# Patient Record
Sex: MALE | Employment: FULL TIME | URBAN - METROPOLITAN AREA
[De-identification: names, ages, dates, MRNs, and addresses within clinical notes are randomized per-mention and may not be internally consistent; named-entity substitution may affect disease eponyms.]

---

## 2020-06-10 ENCOUNTER — APPOINTMENT (OUTPATIENT)
Dept: CT IMAGING | Age: 60
DRG: 377 | End: 2020-06-10
Attending: PHYSICIAN ASSISTANT
Payer: OTHER GOVERNMENT

## 2020-06-10 ENCOUNTER — HOSPITAL ENCOUNTER (INPATIENT)
Age: 60
LOS: 1 days | Discharge: HOME | DRG: 377 | End: 2020-06-12
Attending: EMERGENCY MEDICINE | Admitting: HOSPITALIST
Payer: OTHER GOVERNMENT

## 2020-06-10 DIAGNOSIS — K92.1 MELENA: Primary | ICD-10-CM

## 2020-06-10 DIAGNOSIS — R55 PRE-SYNCOPE: ICD-10-CM

## 2020-06-10 LAB
ABO + RH BLD: NORMAL
ALBUMIN SERPL-MCNC: 3.4 G/DL (ref 3.4–5)
ALBUMIN/GLOB SERPL: 1.2 {RATIO} (ref 0.8–1.7)
ALP SERPL-CCNC: 83 U/L (ref 45–117)
ALT SERPL-CCNC: 27 U/L (ref 16–61)
ANION GAP SERPL CALC-SCNC: 6 MMOL/L (ref 3–18)
APPEARANCE UR: CLEAR
AST SERPL-CCNC: 18 U/L (ref 10–38)
BASOPHILS # BLD: 0 K/UL (ref 0–0.1)
BASOPHILS NFR BLD: 0 % (ref 0–2)
BILIRUB SERPL-MCNC: 0.4 MG/DL (ref 0.2–1)
BILIRUB UR QL: NEGATIVE
BLOOD GROUP ANTIBODIES SERPL: NORMAL
BUN SERPL-MCNC: 31 MG/DL (ref 7–18)
BUN/CREAT SERPL: 31 (ref 12–20)
CALCIUM SERPL-MCNC: 8.4 MG/DL (ref 8.5–10.1)
CHLORIDE SERPL-SCNC: 109 MMOL/L (ref 100–111)
CK MB CFR SERPL CALC: 2.1 % (ref 0–4)
CK MB SERPL-MCNC: 2.7 NG/ML (ref 5–25)
CK SERPL-CCNC: 127 U/L (ref 39–308)
CO2 SERPL-SCNC: 25 MMOL/L (ref 21–32)
COLOR UR: YELLOW
CREAT SERPL-MCNC: 1.01 MG/DL (ref 0.6–1.3)
DIFFERENTIAL METHOD BLD: ABNORMAL
EOSINOPHIL # BLD: 0.1 K/UL (ref 0–0.4)
EOSINOPHIL NFR BLD: 1 % (ref 0–5)
ERYTHROCYTE [DISTWIDTH] IN BLOOD BY AUTOMATED COUNT: 12 % (ref 11.6–14.5)
GLOBULIN SER CALC-MCNC: 2.8 G/DL (ref 2–4)
GLUCOSE SERPL-MCNC: 147 MG/DL (ref 74–99)
GLUCOSE UR STRIP.AUTO-MCNC: NEGATIVE MG/DL
HCT VFR BLD AUTO: 35.9 % (ref 36–48)
HEMOCCULT STL QL: POSITIVE
HGB BLD-MCNC: 12.1 G/DL (ref 13–16)
HGB UR QL STRIP: NEGATIVE
INR PPP: 1.1 (ref 0.8–1.2)
KETONES UR QL STRIP.AUTO: NEGATIVE MG/DL
LEUKOCYTE ESTERASE UR QL STRIP.AUTO: ABNORMAL
LYMPHOCYTES # BLD: 2.1 K/UL (ref 0.9–3.6)
LYMPHOCYTES NFR BLD: 21 % (ref 21–52)
MCH RBC QN AUTO: 29.6 PG (ref 24–34)
MCHC RBC AUTO-ENTMCNC: 33.7 G/DL (ref 31–37)
MCV RBC AUTO: 87.8 FL (ref 74–97)
MONOCYTES # BLD: 0.8 K/UL (ref 0.05–1.2)
MONOCYTES NFR BLD: 8 % (ref 3–10)
NEUTS SEG # BLD: 7.1 K/UL (ref 1.8–8)
NEUTS SEG NFR BLD: 70 % (ref 40–73)
NITRITE UR QL STRIP.AUTO: NEGATIVE
PH UR STRIP: 5 [PH] (ref 5–8)
PLATELET # BLD AUTO: 247 K/UL (ref 135–420)
PMV BLD AUTO: 9.8 FL (ref 9.2–11.8)
POTASSIUM SERPL-SCNC: 3.6 MMOL/L (ref 3.5–5.5)
PROT SERPL-MCNC: 6.2 G/DL (ref 6.4–8.2)
PROT UR STRIP-MCNC: NEGATIVE MG/DL
PROTHROMBIN TIME: 14 SEC (ref 11.5–15.2)
RBC # BLD AUTO: 4.09 M/UL (ref 4.7–5.5)
RBC #/AREA URNS HPF: NORMAL /HPF (ref 0–5)
SODIUM SERPL-SCNC: 140 MMOL/L (ref 136–145)
SP GR UR REFRACTOMETRY: >1.03 (ref 1–1.03)
SPECIMEN EXP DATE BLD: NORMAL
TROPONIN I SERPL-MCNC: 0.04 NG/ML (ref 0–0.04)
UROBILINOGEN UR QL STRIP.AUTO: 1 EU/DL (ref 0.2–1)
WBC # BLD AUTO: 10.1 K/UL (ref 4.6–13.2)
WBC URNS QL MICRO: NORMAL /HPF (ref 0–4)

## 2020-06-10 PROCEDURE — 74011000250 HC RX REV CODE- 250: Performed by: PHYSICIAN ASSISTANT

## 2020-06-10 PROCEDURE — 80053 COMPREHEN METABOLIC PANEL: CPT

## 2020-06-10 PROCEDURE — 85025 COMPLETE CBC W/AUTO DIFF WBC: CPT

## 2020-06-10 PROCEDURE — 82270 OCCULT BLOOD FECES: CPT

## 2020-06-10 PROCEDURE — 74177 CT ABD & PELVIS W/CONTRAST: CPT

## 2020-06-10 PROCEDURE — 82550 ASSAY OF CK (CPK): CPT

## 2020-06-10 PROCEDURE — C9113 INJ PANTOPRAZOLE SODIUM, VIA: HCPCS | Performed by: PHYSICIAN ASSISTANT

## 2020-06-10 PROCEDURE — 99285 EMERGENCY DEPT VISIT HI MDM: CPT

## 2020-06-10 PROCEDURE — 85610 PROTHROMBIN TIME: CPT

## 2020-06-10 PROCEDURE — 96374 THER/PROPH/DIAG INJ IV PUSH: CPT

## 2020-06-10 PROCEDURE — 86900 BLOOD TYPING SEROLOGIC ABO: CPT

## 2020-06-10 PROCEDURE — 74011636320 HC RX REV CODE- 636/320: Performed by: EMERGENCY MEDICINE

## 2020-06-10 PROCEDURE — 74011250636 HC RX REV CODE- 250/636: Performed by: PHYSICIAN ASSISTANT

## 2020-06-10 PROCEDURE — 81001 URINALYSIS AUTO W/SCOPE: CPT

## 2020-06-10 PROCEDURE — 93005 ELECTROCARDIOGRAM TRACING: CPT

## 2020-06-10 RX ORDER — ATORVASTATIN CALCIUM 10 MG/1
5 TABLET, FILM COATED ORAL DAILY
COMMUNITY

## 2020-06-10 RX ORDER — METFORMIN HYDROCHLORIDE 500 MG/1
500 TABLET ORAL 2 TIMES DAILY WITH MEALS
COMMUNITY

## 2020-06-10 RX ORDER — PANTOPRAZOLE SODIUM 40 MG/10ML
80 INJECTION, POWDER, LYOPHILIZED, FOR SOLUTION INTRAVENOUS
Status: DISCONTINUED | OUTPATIENT
Start: 2020-06-10 | End: 2020-06-10 | Stop reason: CLARIF

## 2020-06-10 RX ADMIN — IOPAMIDOL 100 ML: 612 INJECTION, SOLUTION INTRAVENOUS at 19:37

## 2020-06-10 RX ADMIN — SODIUM CHLORIDE 80 MG: 9 INJECTION, SOLUTION INTRAMUSCULAR; INTRAVENOUS; SUBCUTANEOUS at 18:46

## 2020-06-10 RX ADMIN — SODIUM CHLORIDE 1000 ML: 900 INJECTION, SOLUTION INTRAVENOUS at 17:35

## 2020-06-10 NOTE — ED TRIAGE NOTES
Patient states working out in heat yesterday at the [employer] yesterday. He states after taking shower at approximately 1930 last night, he collapsed to the floor but did not lose consciousness. C/o having severe headache last night that ended at noon today. He c/o residual fatigue and dizziness with standing for long periods of time. Denies difficulty with moving extremities. Denies gait instability. C/o dark stools.  States using Pepto Bismol

## 2020-06-10 NOTE — ED PROVIDER NOTES
EMERGENCY DEPARTMENT HISTORY AND PHYSICAL EXAM    5:18 PM      Date: 6/10/2020  Patient Name: Nayeli Pyle    History of Presenting Illness     Chief Complaint   Patient presents with   [place] Fall    Dizziness    Fatigue         History Provided By: Patient    Additional History (Context): Nayeli Pyle is a 60 y.o. male with DM, HLD and past surgical hx of sigmoidectomy, colostomy with reversal who presents with complaint of fatigue, dizziness, nausea, and near syncope that occurred yesterday. Patient notes he was standing up when he collapsed to the floor. Patient denies loss of consciousness. Patient notes persistent dizziness and generalized fatigue. Patient also notes 3 episodes of melanotic stool today. Patient denies fever or chills, chest pain, shortness of breath, vomiting, diarrhea, history of GI bleed. Patient notes he takes aspirin daily. Patient notes last colonoscopy was in 2017. PCP: Casandra Darling MD    Current Facility-Administered Medications   Medication Dose Route Frequency Provider Last Rate Last Dose    iopamidoL (ISOVUE 300) 61 % contrast injection  mL   mL IntraVENous RAD ONCE Dews, Nomi Abt, DO         Current Outpatient Medications   Medication Sig Dispense Refill    metFORMIN (GLUCOPHAGE) 500 mg tablet Take 500 mg by mouth two (2) times daily (with meals).  atorvastatin (LIPITOR) 10 mg tablet Take 5 mg by mouth daily. Past History     Past Medical History:  Past Medical History:   Diagnosis Date    Abscess of sigmoid colon     CPAP (continuous positive airway pressure) dependence     Diabetes (HCC)     High cholesterol     MICHELLE (obstructive sleep apnea)        Past Surgical History:  Past Surgical History:   Procedure Laterality Date    HX GI      sigmoidectomy       Family History:  History reviewed. No pertinent family history.     Social History:  Social History     Tobacco Use    Smoking status: Never Smoker    Smokeless tobacco: Never Used Substance Use Topics    Alcohol use: Yes    Drug use: Never       Allergies:  No Known Allergies      Review of Systems       Review of Systems   Constitutional: Positive for fatigue. Negative for chills and fever. Respiratory: Negative for shortness of breath. Cardiovascular: Negative for chest pain. Gastrointestinal: Positive for abdominal pain, blood in stool and nausea. Negative for vomiting. Skin: Negative for rash. Neurological: Positive for dizziness and syncope. Negative for weakness. All other systems reviewed and are negative. Physical Exam     Visit Vitals  /83   Pulse 68   Temp 98 °F (36.7 °C)   Resp 13   Ht 6' (1.829 m)   Wt 103.4 kg (228 lb)   SpO2 99%   BMI 30.92 kg/m²         Physical Exam  Vitals signs and nursing note reviewed. Exam conducted with a chaperone present (Nurse Caroline Carrizales). Constitutional:       General: He is not in acute distress. Appearance: Normal appearance. He is well-developed. He is not ill-appearing, toxic-appearing or diaphoretic. HENT:      Head: Normocephalic and atraumatic. Neck:      Musculoskeletal: Normal range of motion and neck supple. Cardiovascular:      Rate and Rhythm: Normal rate and regular rhythm. Heart sounds: Normal heart sounds. No murmur. No friction rub. No gallop. Pulmonary:      Effort: Pulmonary effort is normal. No respiratory distress. Breath sounds: Normal breath sounds. No wheezing or rales. Genitourinary:     Rectum: Guaiac result positive. No mass, tenderness, external hemorrhoid or internal hemorrhoid. Comments: Melanotic stool   Musculoskeletal: Normal range of motion. Skin:     General: Skin is warm. Findings: No rash. Neurological:      General: No focal deficit present. Mental Status: He is alert. Cranial Nerves: Cranial nerves are intact. Sensory: Sensation is intact. Motor: Motor function is intact. Coordination: Coordination is intact. Diagnostic Study Results     Labs -  Recent Results (from the past 12 hour(s))   EKG, 12 LEAD, INITIAL    Collection Time: 06/10/20  5:25 PM   Result Value Ref Range    Ventricular Rate 79 BPM    Atrial Rate 79 BPM    P-R Interval 154 ms    QRS Duration 80 ms    Q-T Interval 384 ms    QTC Calculation (Bezet) 440 ms    Calculated P Axis -42 degrees    Calculated R Axis -20 degrees    Calculated T Axis 33 degrees    Diagnosis       Unusual P axis, possible ectopic atrial rhythm with premature atrial   complexes  Minimal voltage criteria for LVH, may be normal variant  Nonspecific T wave abnormality  Abnormal ECG  When compared with ECG of 19-OCT-2011 14:45,  Ectopic atrial rhythm has replaced Sinus rhythm  Nonspecific T wave abnormality now evident in Lateral leads     CBC WITH AUTOMATED DIFF    Collection Time: 06/10/20  5:36 PM   Result Value Ref Range    WBC 10.1 4.6 - 13.2 K/uL    RBC 4.09 (L) 4.70 - 5.50 M/uL    HGB 12.1 (L) 13.0 - 16.0 g/dL    HCT 35.9 (L) 36.0 - 48.0 %    MCV 87.8 74.0 - 97.0 FL    MCH 29.6 24.0 - 34.0 PG    MCHC 33.7 31.0 - 37.0 g/dL    RDW 12.0 11.6 - 14.5 %    PLATELET 972 183 - 769 K/uL    MPV 9.8 9.2 - 11.8 FL    NEUTROPHILS 70 40 - 73 %    LYMPHOCYTES 21 21 - 52 %    MONOCYTES 8 3 - 10 %    EOSINOPHILS 1 0 - 5 %    BASOPHILS 0 0 - 2 %    ABS. NEUTROPHILS 7.1 1.8 - 8.0 K/UL    ABS. LYMPHOCYTES 2.1 0.9 - 3.6 K/UL    ABS. MONOCYTES 0.8 0.05 - 1.2 K/UL    ABS. EOSINOPHILS 0.1 0.0 - 0.4 K/UL    ABS.  BASOPHILS 0.0 0.0 - 0.1 K/UL    DF AUTOMATED     CARDIAC PANEL,(CK, CKMB & TROPONIN)    Collection Time: 06/10/20  5:36 PM   Result Value Ref Range    CK - MB 2.7 <3.6 ng/ml    CK-MB Index 2.1 0.0 - 4.0 %     39 - 308 U/L    Troponin-I, QT 0.04 0.0 - 5.367 NG/ML   METABOLIC PANEL, COMPREHENSIVE    Collection Time: 06/10/20  5:36 PM   Result Value Ref Range    Sodium 140 136 - 145 mmol/L    Potassium 3.6 3.5 - 5.5 mmol/L    Chloride 109 100 - 111 mmol/L    CO2 25 21 - 32 mmol/L Anion gap 6 3.0 - 18 mmol/L    Glucose 147 (H) 74 - 99 mg/dL    BUN 31 (H) 7.0 - 18 MG/DL    Creatinine 1.01 0.6 - 1.3 MG/DL    BUN/Creatinine ratio 31 (H) 12 - 20      GFR est AA >60 >60 ml/min/1.73m2    GFR est non-AA >60 >60 ml/min/1.73m2    Calcium 8.4 (L) 8.5 - 10.1 MG/DL    Bilirubin, total 0.4 0.2 - 1.0 MG/DL    ALT (SGPT) 27 16 - 61 U/L    AST (SGOT) 18 10 - 38 U/L    Alk. phosphatase 83 45 - 117 U/L    Protein, total 6.2 (L) 6.4 - 8.2 g/dL    Albumin 3.4 3.4 - 5.0 g/dL    Globulin 2.8 2.0 - 4.0 g/dL    A-G Ratio 1.2 0.8 - 1.7     PROTHROMBIN TIME + INR    Collection Time: 06/10/20  5:36 PM   Result Value Ref Range    Prothrombin time 14.0 11.5 - 15.2 sec    INR 1.1 0.8 - 1.2     POC FECAL OCCULT BLOOD    Collection Time: 06/10/20  5:41 PM   Result Value Ref Range    Occult blood, stool (POC) Positive (A) NEG         Radiologic Studies -   CT ABD PELV W CONT    (Results Pending)         Medical Decision Making   I am the first provider for this patient. I reviewed the vital signs, available nursing notes, past medical history, past surgical history, family history and social history. Vital Signs-Reviewed the patient's vital signs. Records Reviewed: Nursing Notes and Old Medical Records (Time of Review: 5:18 PM)    ED Course: Progress Notes, Reevaluation, and Consults:  PROGRESS NOTE:  7:19 PM   Patient care will be transferred to Dr. Venkat Barnes. Discussed available diagnostic results and care plan at length. Pending CT, GI consultation, and admission for UGIB. Written by Brodie Martinez PA-C      Diagnosis     Clinical Impression:   1. Melena    2. Pre-syncope        Disposition: admission    Follow-up Information    None          Patient's Medications   Start Taking    No medications on file   Continue Taking    ATORVASTATIN (LIPITOR) 10 MG TABLET    Take 5 mg by mouth daily. METFORMIN (GLUCOPHAGE) 500 MG TABLET    Take 500 mg by mouth two (2) times daily (with meals).    These Medications have changed    No medications on file   Stop Taking    No medications on file       Dictation disclaimer:  Please note that this dictation was completed with [employer], the computer voice recognition software. Quite often unanticipated grammatical, syntax, homophones, and other interpretive errors are inadvertently transcribed by the computer software. Please disregard these errors. Please excuse any errors that have escaped final proofreading.

## 2020-06-11 ENCOUNTER — ANESTHESIA EVENT (OUTPATIENT)
Dept: ENDOSCOPY | Age: 60
DRG: 377 | End: 2020-06-11
Payer: OTHER GOVERNMENT

## 2020-06-11 ENCOUNTER — ANESTHESIA (OUTPATIENT)
Dept: ENDOSCOPY | Age: 60
DRG: 377 | End: 2020-06-11
Payer: OTHER GOVERNMENT

## 2020-06-11 PROBLEM — K92.2 GI BLEED: Status: ACTIVE | Noted: 2020-06-11

## 2020-06-11 PROBLEM — K92.2 GI BLEEDING: Status: ACTIVE | Noted: 2020-06-11

## 2020-06-11 LAB
ATRIAL RATE: 79 BPM
BASOPHILS # BLD: 0 K/UL (ref 0–0.1)
BASOPHILS NFR BLD: 0 % (ref 0–2)
CALCULATED P AXIS, ECG09: -42 DEGREES
CALCULATED R AXIS, ECG10: -20 DEGREES
CALCULATED T AXIS, ECG11: 33 DEGREES
DIAGNOSIS, 93000: NORMAL
DIFFERENTIAL METHOD BLD: ABNORMAL
EOSINOPHIL # BLD: 0.1 K/UL (ref 0–0.4)
EOSINOPHIL NFR BLD: 1 % (ref 0–5)
ERYTHROCYTE [DISTWIDTH] IN BLOOD BY AUTOMATED COUNT: 12 % (ref 11.6–14.5)
EST. AVERAGE GLUCOSE BLD GHB EST-MCNC: 154 MG/DL
GLUCOSE BLD STRIP.AUTO-MCNC: 107 MG/DL (ref 70–110)
GLUCOSE BLD STRIP.AUTO-MCNC: 121 MG/DL (ref 70–110)
GLUCOSE BLD STRIP.AUTO-MCNC: 121 MG/DL (ref 70–110)
GLUCOSE BLD STRIP.AUTO-MCNC: 125 MG/DL (ref 70–110)
GLUCOSE BLD STRIP.AUTO-MCNC: 97 MG/DL (ref 70–110)
HBA1C MFR BLD: 7 % (ref 4.2–5.6)
HCT VFR BLD AUTO: 28.9 % (ref 36–48)
HCT VFR BLD AUTO: 28.9 % (ref 36–48)
HCT VFR BLD AUTO: 29.8 % (ref 36–48)
HCT VFR BLD AUTO: 32.4 % (ref 36–48)
HGB BLD-MCNC: 10 G/DL (ref 13–16)
HGB BLD-MCNC: 10.4 G/DL (ref 13–16)
HGB BLD-MCNC: 10.9 G/DL (ref 13–16)
HGB BLD-MCNC: 9.8 G/DL (ref 13–16)
LYMPHOCYTES # BLD: 2.5 K/UL (ref 0.9–3.6)
LYMPHOCYTES NFR BLD: 26 % (ref 21–52)
MCH RBC QN AUTO: 29.6 PG (ref 24–34)
MCHC RBC AUTO-ENTMCNC: 33.6 G/DL (ref 31–37)
MCV RBC AUTO: 88 FL (ref 74–97)
MONOCYTES # BLD: 0.9 K/UL (ref 0.05–1.2)
MONOCYTES NFR BLD: 9 % (ref 3–10)
NEUTS SEG # BLD: 6.2 K/UL (ref 1.8–8)
NEUTS SEG NFR BLD: 64 % (ref 40–73)
P-R INTERVAL, ECG05: 154 MS
PLATELET # BLD AUTO: 223 K/UL (ref 135–420)
PMV BLD AUTO: 9.7 FL (ref 9.2–11.8)
Q-T INTERVAL, ECG07: 384 MS
QRS DURATION, ECG06: 80 MS
QTC CALCULATION (BEZET), ECG08: 440 MS
RBC # BLD AUTO: 3.68 M/UL (ref 4.7–5.5)
VENTRICULAR RATE, ECG03: 79 BPM
WBC # BLD AUTO: 9.8 K/UL (ref 4.6–13.2)

## 2020-06-11 PROCEDURE — 88305 TISSUE EXAM BY PATHOLOGIST: CPT

## 2020-06-11 PROCEDURE — 0DB78ZX EXCISION OF STOMACH, PYLORUS, VIA NATURAL OR ARTIFICIAL OPENING ENDOSCOPIC, DIAGNOSTIC: ICD-10-PCS | Performed by: INTERNAL MEDICINE

## 2020-06-11 PROCEDURE — 36415 COLL VENOUS BLD VENIPUNCTURE: CPT

## 2020-06-11 PROCEDURE — 74011250636 HC RX REV CODE- 250/636: Performed by: HOSPITALIST

## 2020-06-11 PROCEDURE — 82962 GLUCOSE BLOOD TEST: CPT

## 2020-06-11 PROCEDURE — 74011000250 HC RX REV CODE- 250: Performed by: NURSE ANESTHETIST, CERTIFIED REGISTERED

## 2020-06-11 PROCEDURE — 83036 HEMOGLOBIN GLYCOSYLATED A1C: CPT

## 2020-06-11 PROCEDURE — 77030008565 HC TBNG SUC IRR ERBE -B: Performed by: INTERNAL MEDICINE

## 2020-06-11 PROCEDURE — 85018 HEMOGLOBIN: CPT

## 2020-06-11 PROCEDURE — 77030019988 HC FCPS ENDOSC DISP BSC -B: Performed by: INTERNAL MEDICINE

## 2020-06-11 PROCEDURE — 76040000019: Performed by: INTERNAL MEDICINE

## 2020-06-11 PROCEDURE — 74011250636 HC RX REV CODE- 250/636: Performed by: NURSE ANESTHETIST, CERTIFIED REGISTERED

## 2020-06-11 PROCEDURE — C9113 INJ PANTOPRAZOLE SODIUM, VIA: HCPCS | Performed by: HOSPITALIST

## 2020-06-11 PROCEDURE — 76060000031 HC ANESTHESIA FIRST 0.5 HR: Performed by: INTERNAL MEDICINE

## 2020-06-11 PROCEDURE — 77030018846 HC SOL IRR STRL H20 ICUM -A

## 2020-06-11 PROCEDURE — 65660000000 HC RM CCU STEPDOWN

## 2020-06-11 PROCEDURE — 85025 COMPLETE CBC W/AUTO DIFF WBC: CPT

## 2020-06-11 PROCEDURE — 74011250636 HC RX REV CODE- 250/636: Performed by: EMERGENCY MEDICINE

## 2020-06-11 PROCEDURE — 74011250637 HC RX REV CODE- 250/637: Performed by: HOSPITALIST

## 2020-06-11 PROCEDURE — 77030018846 HC SOL IRR STRL H20 ICUM -A: Performed by: INTERNAL MEDICINE

## 2020-06-11 RX ORDER — SODIUM CHLORIDE 0.9 % (FLUSH) 0.9 %
5-40 SYRINGE (ML) INJECTION EVERY 8 HOURS
Status: CANCELLED | OUTPATIENT
Start: 2020-06-11

## 2020-06-11 RX ORDER — PANTOPRAZOLE SODIUM 40 MG/1
40 TABLET, DELAYED RELEASE ORAL
Status: DISCONTINUED | OUTPATIENT
Start: 2020-06-12 | End: 2020-06-12 | Stop reason: HOSPADM

## 2020-06-11 RX ORDER — INSULIN LISPRO 100 [IU]/ML
INJECTION, SOLUTION INTRAVENOUS; SUBCUTANEOUS
Status: DISCONTINUED | OUTPATIENT
Start: 2020-06-11 | End: 2020-06-12 | Stop reason: HOSPADM

## 2020-06-11 RX ORDER — SODIUM CHLORIDE, SODIUM LACTATE, POTASSIUM CHLORIDE, CALCIUM CHLORIDE 600; 310; 30; 20 MG/100ML; MG/100ML; MG/100ML; MG/100ML
50 INJECTION, SOLUTION INTRAVENOUS CONTINUOUS
Status: CANCELLED | OUTPATIENT
Start: 2020-06-11

## 2020-06-11 RX ORDER — MAGNESIUM SULFATE 100 %
4 CRYSTALS MISCELLANEOUS AS NEEDED
Status: CANCELLED | OUTPATIENT
Start: 2020-06-11

## 2020-06-11 RX ORDER — DEXTROSE 50 % IN WATER (D50W) INTRAVENOUS SYRINGE
25-50 AS NEEDED
Status: CANCELLED | OUTPATIENT
Start: 2020-06-11

## 2020-06-11 RX ORDER — SODIUM CHLORIDE, SODIUM LACTATE, POTASSIUM CHLORIDE, CALCIUM CHLORIDE 600; 310; 30; 20 MG/100ML; MG/100ML; MG/100ML; MG/100ML
50 INJECTION, SOLUTION INTRAVENOUS CONTINUOUS
Status: DISCONTINUED | OUTPATIENT
Start: 2020-06-11 | End: 2020-06-11 | Stop reason: HOSPADM

## 2020-06-11 RX ORDER — FAMOTIDINE 20 MG/1
20 TABLET, FILM COATED ORAL ONCE
Status: DISCONTINUED | OUTPATIENT
Start: 2020-06-11 | End: 2020-06-11 | Stop reason: HOSPADM

## 2020-06-11 RX ORDER — ACETAMINOPHEN 325 MG/1
650 TABLET ORAL
Status: DISCONTINUED | OUTPATIENT
Start: 2020-06-11 | End: 2020-06-12 | Stop reason: HOSPADM

## 2020-06-11 RX ORDER — SODIUM CHLORIDE 9 MG/ML
500 INJECTION, SOLUTION INTRAVENOUS CONTINUOUS
Status: DISCONTINUED | OUTPATIENT
Start: 2020-06-11 | End: 2020-06-11

## 2020-06-11 RX ORDER — PROPOFOL 10 MG/ML
INJECTION, EMULSION INTRAVENOUS AS NEEDED
Status: DISCONTINUED | OUTPATIENT
Start: 2020-06-11 | End: 2020-06-11 | Stop reason: HOSPADM

## 2020-06-11 RX ORDER — INSULIN LISPRO 100 [IU]/ML
INJECTION, SOLUTION INTRAVENOUS; SUBCUTANEOUS ONCE
Status: CANCELLED | OUTPATIENT
Start: 2020-06-11 | End: 2020-06-12

## 2020-06-11 RX ORDER — MAGNESIUM SULFATE 100 %
4 CRYSTALS MISCELLANEOUS AS NEEDED
Status: DISCONTINUED | OUTPATIENT
Start: 2020-06-11 | End: 2020-06-12 | Stop reason: HOSPADM

## 2020-06-11 RX ORDER — SODIUM CHLORIDE 9 MG/ML
125 INJECTION, SOLUTION INTRAVENOUS CONTINUOUS
Status: DISCONTINUED | OUTPATIENT
Start: 2020-06-11 | End: 2020-06-12

## 2020-06-11 RX ORDER — LIDOCAINE HYDROCHLORIDE 20 MG/ML
INJECTION, SOLUTION EPIDURAL; INFILTRATION; INTRACAUDAL; PERINEURAL AS NEEDED
Status: DISCONTINUED | OUTPATIENT
Start: 2020-06-11 | End: 2020-06-11 | Stop reason: HOSPADM

## 2020-06-11 RX ORDER — ATORVASTATIN CALCIUM 10 MG/1
5 TABLET, FILM COATED ORAL DAILY
Status: DISCONTINUED | OUTPATIENT
Start: 2020-06-11 | End: 2020-06-12 | Stop reason: HOSPADM

## 2020-06-11 RX ORDER — PANTOPRAZOLE SODIUM 40 MG/10ML
40 INJECTION, POWDER, LYOPHILIZED, FOR SOLUTION INTRAVENOUS EVERY 12 HOURS
Status: DISCONTINUED | OUTPATIENT
Start: 2020-06-11 | End: 2020-06-11

## 2020-06-11 RX ORDER — ADHESIVE BANDAGE
30 BANDAGE TOPICAL DAILY PRN
Status: DISCONTINUED | OUTPATIENT
Start: 2020-06-11 | End: 2020-06-12 | Stop reason: HOSPADM

## 2020-06-11 RX ORDER — INSULIN LISPRO 100 [IU]/ML
INJECTION, SOLUTION INTRAVENOUS; SUBCUTANEOUS ONCE
Status: DISCONTINUED | OUTPATIENT
Start: 2020-06-11 | End: 2020-06-11 | Stop reason: HOSPADM

## 2020-06-11 RX ORDER — SODIUM CHLORIDE 0.9 % (FLUSH) 0.9 %
5-40 SYRINGE (ML) INJECTION AS NEEDED
Status: CANCELLED | OUTPATIENT
Start: 2020-06-11

## 2020-06-11 RX ORDER — ONDANSETRON 2 MG/ML
4 INJECTION INTRAMUSCULAR; INTRAVENOUS
Status: DISCONTINUED | OUTPATIENT
Start: 2020-06-11 | End: 2020-06-12 | Stop reason: HOSPADM

## 2020-06-11 RX ORDER — DEXTROSE 50 % IN WATER (D50W) INTRAVENOUS SYRINGE
25-50 AS NEEDED
Status: DISCONTINUED | OUTPATIENT
Start: 2020-06-11 | End: 2020-06-12 | Stop reason: HOSPADM

## 2020-06-11 RX ADMIN — PROPOFOL 80 MG: 10 INJECTION, EMULSION INTRAVENOUS at 17:52

## 2020-06-11 RX ADMIN — SODIUM CHLORIDE 125 ML/HR: 900 INJECTION, SOLUTION INTRAVENOUS at 17:04

## 2020-06-11 RX ADMIN — PROPOFOL 40 MG: 10 INJECTION, EMULSION INTRAVENOUS at 17:54

## 2020-06-11 RX ADMIN — LIDOCAINE HYDROCHLORIDE 60 MG: 20 INJECTION, SOLUTION EPIDURAL; INFILTRATION; INTRACAUDAL; PERINEURAL at 17:54

## 2020-06-11 RX ADMIN — PANTOPRAZOLE SODIUM 40 MG: 40 INJECTION, POWDER, FOR SOLUTION INTRAVENOUS at 08:51

## 2020-06-11 RX ADMIN — SODIUM CHLORIDE 500 ML: 9 INJECTION, SOLUTION INTRAVENOUS at 04:06

## 2020-06-11 RX ADMIN — SODIUM CHLORIDE 125 ML/HR: 900 INJECTION, SOLUTION INTRAVENOUS at 07:00

## 2020-06-11 RX ADMIN — SODIUM CHLORIDE, SODIUM LACTATE, POTASSIUM CHLORIDE, AND CALCIUM CHLORIDE: 600; 310; 30; 20 INJECTION, SOLUTION INTRAVENOUS at 17:15

## 2020-06-11 RX ADMIN — SODIUM CHLORIDE 125 ML/HR: 900 INJECTION, SOLUTION INTRAVENOUS at 16:13

## 2020-06-11 NOTE — CDMP QUERY
Patient admitted with hypotension. Please specify the underlying cause of the hypotension, if known. 1.  Hypotension due to decreased hydration 2. Hypotension due to acute blood loss anemia from GI bleed 3. Hypotension due to other cause, please specify: 
4. Unknown, unable to determine The medical record reflects the following:   
   Risk Factors: GI bleed, acute blood loss anemia Clinical Indicators:  
Mild hypotension:  Improved with IV fluids, continue IV fluids\"  And 
\". . . guaiac positive stool. His hemoglobin also dropped, CT was done which was within normal image. \"  Per Dr. Ashley Enrique, H&P, 6/11/2020 H/H:  12.1/35.9 on 6/10/2020 and 10.9/32.4 on 6/11/2020 Treatment:  
Normal saline 0.9%  ml on 6/11/2020  At 0406 Normal saline 0.9% IV 1,000 ml on 6/102020 at 1723 
normal saline 0.9% IV @ 125 ml/hr start 6/11/2020 0600 stop 6/12/2020 0559 Thank you, SAGE Segovia RN, [email or web address] 
Cell # for Livia Ordonez RN, CDS supervisor:  [phone]

## 2020-06-11 NOTE — PROGRESS NOTES
[facility] Hospitalist Group  Progress Note    Patient: Genna Kemp Age: 60 y.o. : 1960 MR#: [id number] SSN: [id number]  Date: 2020    Subjective/24-hour events:     Feeling better overall. No obvious additional bleeding issues overnight. Denies chest pain or shortness of breath. Assessment:   GI bleed, suspect upper source  Acute blood loss anemia secondary to above  Hypotension secondary to acute blood loss  Near syncope  Dyslipidemia  DM 2  Obesity, BMI 30.9    Plan:  EGD today. IV PPI. Continue IVF for now. Monitor H&H, transfuse as necessary. SSI, monitor sugars. Continue statin as ordered prior to admission. Case discussed with:  [x]Patient  []Family  [x]Nursing  [x]Case Management  DVT Prophylaxis:  []Lovenox  []Hep SQ  []SCDs  []Coumadin   []On Heparin gtt    Objective:   VS:   Visit Vitals  /70 (BP 1 Location: Left arm, BP Patient Position: At rest)   Pulse 70   Temp 97.7 °F (36.5 °C)   Resp 16   Ht 6' (1.829 m)   Wt 103.4 kg (228 lb)   SpO2 93%   BMI 30.92 kg/m²      Tmax/24hrs: Temp (24hrs), Av.6 °F (36.4 °C), Min:97 °F (36.1 °C), Max:98 °F (36.7 °C)      Intake/Output Summary (Last 24 hours) at 2020 0937  Last data filed at 6/10/2020 1911  Gross per 24 hour   Intake 950 ml   Output    Net 950 ml       General: In NAD. Cardiovascular: RRR. Pulmonary: Lungs clear, no wheezes. GI: Abdomen soft, nontender. Extremities: Warm, no edema or ischemia. Neuro: Awake and alert, motor nonfocal.  Moves extremities spontaneously.     Labs:    Recent Results (from the past 24 hour(s))   EKG, 12 LEAD, INITIAL    Collection Time: 06/10/20  5:25 PM   Result Value Ref Range    Ventricular Rate 79 BPM    Atrial Rate 79 BPM    P-R Interval 154 ms    QRS Duration 80 ms    Q-T Interval 384 ms    QTC Calculation (Bezet) 440 ms    Calculated P Axis -42 degrees    Calculated R Axis -20 degrees    Calculated T Axis 33 degrees    Diagnosis       Unusual P axis, possible ectopic atrial rhythm with premature atrial   complexes  Minimal voltage criteria for LVH, may be normal variant  Nonspecific T wave abnormality  Abnormal ECG  When compared with ECG of 19-OCT-2011 14:45,  Ectopic atrial rhythm has replaced Sinus rhythm  Nonspecific T wave abnormality now evident in Lateral leads  Confirmed by Bouchra Workman MD, --- ([id number]) on 6/11/2020 7:59:52 AM     CBC WITH AUTOMATED DIFF    Collection Time: 06/10/20  5:36 PM   Result Value Ref Range    WBC 10.1 4.6 - 13.2 K/uL    RBC 4.09 (L) 4.70 - 5.50 M/uL    HGB 12.1 (L) 13.0 - 16.0 g/dL    HCT 35.9 (L) 36.0 - 48.0 %    MCV 87.8 74.0 - 97.0 FL    MCH 29.6 24.0 - 34.0 PG    MCHC 33.7 31.0 - 37.0 g/dL    RDW 12.0 11.6 - 14.5 %    PLATELET 069 471 - 696 K/uL    MPV 9.8 9.2 - 11.8 FL    NEUTROPHILS 70 40 - 73 %    LYMPHOCYTES 21 21 - 52 %    MONOCYTES 8 3 - 10 %    EOSINOPHILS 1 0 - 5 %    BASOPHILS 0 0 - 2 %    ABS. NEUTROPHILS 7.1 1.8 - 8.0 K/UL    ABS. LYMPHOCYTES 2.1 0.9 - 3.6 K/UL    ABS. MONOCYTES 0.8 0.05 - 1.2 K/UL    ABS. EOSINOPHILS 0.1 0.0 - 0.4 K/UL    ABS. BASOPHILS 0.0 0.0 - 0.1 K/UL    DF AUTOMATED     CARDIAC PANEL,(CK, CKMB & TROPONIN)    Collection Time: 06/10/20  5:36 PM   Result Value Ref Range    CK - MB 2.7 <3.6 ng/ml    CK-MB Index 2.1 0.0 - 4.0 %     39 - 308 U/L    Troponin-I, QT 0.04 0.0 - 2.558 NG/ML   METABOLIC PANEL, COMPREHENSIVE    Collection Time: 06/10/20  5:36 PM   Result Value Ref Range    Sodium 140 136 - 145 mmol/L    Potassium 3.6 3.5 - 5.5 mmol/L    Chloride 109 100 - 111 mmol/L    CO2 25 21 - 32 mmol/L    Anion gap 6 3.0 - 18 mmol/L    Glucose 147 (H) 74 - 99 mg/dL    BUN 31 (H) 7.0 - 18 MG/DL    Creatinine 1.01 0.6 - 1.3 MG/DL    BUN/Creatinine ratio 31 (H) 12 - 20      GFR est AA >60 >60 ml/min/1.73m2    GFR est non-AA >60 >60 ml/min/1.73m2    Calcium 8.4 (L) 8.5 - 10.1 MG/DL    Bilirubin, total 0.4 0.2 - 1.0 MG/DL    ALT (SGPT) 27 16 - 61 U/L    AST (SGOT) 18 10 - 38 U/L    Alk. phosphatase 83 45 - 117 U/L    Protein, total 6.2 (L) 6.4 - 8.2 g/dL    Albumin 3.4 3.4 - 5.0 g/dL    Globulin 2.8 2.0 - 4.0 g/dL    A-G Ratio 1.2 0.8 - 1.7     PROTHROMBIN TIME + INR    Collection Time: 06/10/20  5:36 PM   Result Value Ref Range    Prothrombin time 14.0 11.5 - 15.2 sec    INR 1.1 0.8 - 1.2     POC FECAL OCCULT BLOOD    Collection Time: 06/10/20  5:41 PM   Result Value Ref Range    Occult blood, stool (POC) Positive (A) NEG     TYPE & SCREEN    Collection Time: 06/10/20  6:32 PM   Result Value Ref Range    Crossmatch Expiration 06/13/2020     ABO/Rh(D) Leena Stai POSITIVE     Antibody screen NEG    URINALYSIS W/ RFLX MICROSCOPIC    Collection Time: 06/10/20  8:03 PM   Result Value Ref Range    Color YELLOW      Appearance CLEAR      Specific gravity >1.030 (H) 1.005 - 1.030    pH (UA) 5.0 5.0 - 8.0      Protein Negative NEG mg/dL    Glucose Negative NEG mg/dL    Ketone Negative NEG mg/dL    Bilirubin Negative NEG      Blood Negative NEG      Urobilinogen 1.0 0.2 - 1.0 EU/dL    Nitrites Negative NEG      Leukocyte Esterase TRACE (A) NEG     URINE MICROSCOPIC ONLY    Collection Time: 06/10/20  8:03 PM   Result Value Ref Range    WBC 0 to 3 0 - 4 /hpf    RBC 0 to 3 0 - 5 /hpf   CBC WITH AUTOMATED DIFF    Collection Time: 06/11/20  1:20 AM   Result Value Ref Range    WBC 9.8 4.6 - 13.2 K/uL    RBC 3.68 (L) 4.70 - 5.50 M/uL    HGB 10.9 (L) 13.0 - 16.0 g/dL    HCT 32.4 (L) 36.0 - 48.0 %    MCV 88.0 74.0 - 97.0 FL    MCH 29.6 24.0 - 34.0 PG    MCHC 33.6 31.0 - 37.0 g/dL    RDW 12.0 11.6 - 14.5 %    PLATELET 929 911 - 985 K/uL    MPV 9.7 9.2 - 11.8 FL    NEUTROPHILS 64 40 - 73 %    LYMPHOCYTES 26 21 - 52 %    MONOCYTES 9 3 - 10 %    EOSINOPHILS 1 0 - 5 %    BASOPHILS 0 0 - 2 %    ABS. NEUTROPHILS 6.2 1.8 - 8.0 K/UL    ABS. LYMPHOCYTES 2.5 0.9 - 3.6 K/UL    ABS. MONOCYTES 0.9 0.05 - 1.2 K/UL    ABS. EOSINOPHILS 0.1 0.0 - 0.4 K/UL    ABS.  BASOPHILS 0.0 0.0 - 0.1 K/UL    DF AUTOMATED     GLUCOSE, POC    Collection Time: 06/11/20  7:51 AM   Result Value Ref Range    Glucose (POC) 121 (H) 70 - 110 mg/dL       Signed By: Beverly De León MD     June 11, 2020

## 2020-06-11 NOTE — ROUTINE PROCESS
Bedside and Verbal shift change report given to Avtar Morgan RN (oncoming nurse) by Dennis Brantley RN 
 (offgoing nurse). Report included the following information SBAR and Kardex.

## 2020-06-11 NOTE — ED NOTES
Pt's BP decreased to 80s and 90s SBP; received verbal order from Dr. Isreal Moffett for 500cc NS bolus which is infusing currently without s/s of infiltration. Phone call placed to 2S receiving nurse Clara Cotter to give update. Pt remains A&Ox4 and currently denying c/o dizziness.

## 2020-06-11 NOTE — PROGRESS NOTES
0725  Bedside and Verbal shift change report received from  [facility] RN(offgoing nurse). Report included the following information SBAR, Kardex, MAR and Recent Results. [address] [facility] [address]  Telephone report received from [facility] in PACU  1900  Patient returned to [address] Patient resting quietly in bed talking on phone. Denies any problems/complaints at this time. 1925  Bedside and Verbal shift change report given to Shaneka CABRERA (oncoming nurse) by Gus Hester RN (offgoing nurse). Report included the following information SBAR, Kardex, MAR and Recent Results.

## 2020-06-11 NOTE — ED NOTES
Pt transported via [address] ambulance at this time enroute to [facility]

## 2020-06-11 NOTE — H&P
History & Physical    Patient: Clarita Goetz MRN: [id number]  [facility]: [id number]    YOB: 1960  Age: 60 y.o. Sex: male      DOA: 6/10/2020    Chief Complaint:   Chief Complaint   Patient presents with    Fall    Dizziness    Fatigue          HPI:     Clarita Goetz is a 60 y.o.  male with past medical history of diabetes mellitus and dyslipidemia comes to the emergency room after an episode of near syncope. Per patient he started having black stools yesterday after couple of black stools he took a shower and he was drying himself. During that time he felt very weak tired and felt he is going to fall down but he held himself and crawl to the bed. He denies any loss of consciousness, denies any blacking out either. He went to bed but he had a couple of more black stools then decided come to the ED for further evaluation. In the emergency room patient was noted to have guaiac positive stool. His hemoglobin also dropped, CT was done which was within normal image. Patient has been transferred to [facility] management. Patient currently feeling much better, denies any dizziness or lightheadedness. He denies any nausea or vomiting, no abdominal pain, no use of NSAIDs, no bloody stools, no hematemesis. No cough, no shortness of breath or fever. Past Medical History:   Diagnosis Date    Abscess of sigmoid colon     CPAP (continuous positive airway pressure) dependence     Diabetes (HCC)     High cholesterol     MICHELLE (obstructive sleep apnea)        Past Surgical History:   Procedure Laterality Date    HX GI      sigmoidectomy       History reviewed. No pertinent family history.     Social History     Socioeconomic History    Marital status:      Spouse name: Not on file    Number of children: Not on file    Years of education: Not on file    Highest education level: Not on file   Tobacco Use    Smoking status: Never Smoker    Smokeless tobacco: Never Used   Substance and Sexual Activity    Alcohol use: Yes    Drug use: Never       Prior to Admission medications    Medication Sig Start Date End Date Taking? Authorizing Provider   metFORMIN (GLUCOPHAGE) 500 mg tablet Take 500 mg by mouth two (2) times daily (with meals). Yes Phong, MD Casandra   atorvastatin (LIPITOR) 10 mg tablet Take 5 mg by mouth daily. Yes Other, MD Casandra       No Known Allergies      Review of Systems  GENERAL: Patient alert, awake and oriented times 3, able to communicate full sentences and not in distress. HEENT: No change in vision, no earache, tinnitus, sore throat or sinus congestion. NECK: No pain or stiffness. PULMONARY: No shortness of breath, cough or wheeze. Cardiovascular: no pnd or orthopnea, no CP  GASTROINTESTINAL: No abdominal pain, nausea, vomiting or diarrhea, no bright red blood per rectum. Melena ++  GENITOURINARY: No urinary frequency, urgency, hesitancy or dysuria. MUSCULOSKELETAL: No joint or muscle pain, no back pain, no recent trauma. DERMATOLOGIC: No rash, no itching, no lesions. ENDOCRINE: No polyuria, polydipsia, no heat or cold intolerance. No recent change in weight. HEMATOLOGICAL: No anemia or easy bruising or bleeding. NEUROLOGIC: No headache, seizures, numbness, tingling or weakness. Physical Exam:     Physical Exam:  Visit Vitals  /70 (BP 1 Location: Left arm)   Pulse 61   Temp 97 °F (36.1 °C)   Resp 16   Ht 6' (1.829 m)   Wt 103.4 kg (228 lb)   SpO2 96%   BMI 30.92 kg/m²      O2 Device: Room air    Temp (24hrs), Av.5 °F (36.4 °C), Min:97 °F (36.1 °C), Max:98 °F (36.7 °C)    06/10 1901 -  0700  In: 950 [I.V.:950]  Out: -    No intake/output data recorded. General:  Alert, cooperative, no distress, appears stated age. Head: Normocephalic, without obvious abnormality, atraumatic. Eyes:  Conjunctivae/corneas clear. PERRL, EOMs intact. Nose: Nares normal. No drainage or sinus tenderness.    Neck: Supple, symmetrical, trachea midline, no adenopathy, thyroid: no enlargement, no carotid bruit and no JVD. Lungs:   Clear to auscultation bilaterally. Heart:  Regular rate and rhythm, S1, S2 normal.     Abdomen: Soft, non-tender. Bowel sounds normal.    Extremities: Extremities normal, atraumatic, no cyanosis or edema. Pulses: 2+ and symmetric all extremities. Skin:  No rashes or lesions   Neurologic: AAOx3, No focal motor or sensory deficit.        Labs Reviewed:    BMP:   Lab Results   Component Value Date/Time     06/10/2020 05:36 PM    K 3.6 06/10/2020 05:36 PM     06/10/2020 05:36 PM    CO2 25 06/10/2020 05:36 PM    AGAP 6 06/10/2020 05:36 PM     (H) 06/10/2020 05:36 PM    BUN 31 (H) 06/10/2020 05:36 PM    CREA 1.01 06/10/2020 05:36 PM    GFRAA >60 06/10/2020 05:36 PM    GFRNA >60 06/10/2020 05:36 PM     CMP:   Lab Results   Component Value Date/Time     06/10/2020 05:36 PM    K 3.6 06/10/2020 05:36 PM     06/10/2020 05:36 PM    CO2 25 06/10/2020 05:36 PM    AGAP 6 06/10/2020 05:36 PM     (H) 06/10/2020 05:36 PM    BUN 31 (H) 06/10/2020 05:36 PM    CREA 1.01 06/10/2020 05:36 PM    GFRAA >60 06/10/2020 05:36 PM    GFRNA >60 06/10/2020 05:36 PM    CA 8.4 (L) 06/10/2020 05:36 PM    ALB 3.4 06/10/2020 05:36 PM    TP 6.2 (L) 06/10/2020 05:36 PM    GLOB 2.8 06/10/2020 05:36 PM    AGRAT 1.2 06/10/2020 05:36 PM    ALT 27 06/10/2020 05:36 PM     CBC:   Lab Results   Component Value Date/Time    WBC 9.8 06/11/2020 01:20 AM    HGB 10.9 (L) 06/11/2020 01:20 AM    HCT 32.4 (L) 06/11/2020 01:20 AM     06/11/2020 01:20 AM     All Cardiac Markers in the last 24 hours:   Lab Results   Component Value Date/Time     06/10/2020 05:36 PM    CKMB 2.7 06/10/2020 05:36 PM    CKND1 2.1 06/10/2020 05:36 PM    TROIQ 0.04 06/10/2020 05:36 PM     CT Results (most recent):  Results from Hospital Encounter encounter on 06/10/20   CT ABD PELV W CONT    Narrative EXAM: CT ABD PELV W CONT    CLINICAL INDICATION/HISTORY: abdominal pain, nausea, melena; hx of multiple  surgeries in the past    TECHNIQUE: Contiguous axial images were obtained through the abdomen and pelvis. From these, sagittal and coronal reconstructions were generated. Contrast used: 100 cc Isovue-300     CT scans at this facility are performed using dose optimization technique as  appropriate with performed exam, to include automated exposure control,  adjustment of mA and/or kV according to patient's size (including appropriate  matching for site-specific examinations), or use of iterative reconstruction  technique. COMPARISON: None    FINDINGS:        Lower chest: No acute findings. Small less than 5 mm nodular focus along the  left hemidiaphragm laterally. Liver: Unremarkable     Gallbladder/biliary: Unremarkable    Spleen: Unremarkable    Pancreas: Unremarkable    Left Kidney: No stones or hydronephrosis. Small rounded cystic hypodensity at  the medial aspect. Right kidney: No stones or hydronephrosis. Small cystic hypodensity at the  anterior aspect. Adrenals: Unremarkable    Bowels/mesentery: No obstruction or mesenteric inflammation. Surgical ligature  at the rectosigmoid junction. Appendix: Not specifically identified, no regional inflammation. Peritoneal Spaces: No intraperitoneal free air. No free fluid. No fluid  collection. Urinary bladder: Unremarkable      Pelvic organs: Unremarkable    Vascular: Aorta unremarkable for age. IVC unremarkable. Lymph Nodes: No adenopathy. Bones: No acute findings. Unremarkable for age.       Impression IMPRESSION[de-identified]    1.  No acute findings within the abdomen or pelvis  -Details and incidentals as above           Procedures/imaging: see electronic medical records for all procedures/Xrays and details which were not copied into this note but were reviewed prior to creation of Plan      Assessment/Plan     1. GI bleed: Possible upper GI: We will get GI evaluation, start PPI, keep n.p.o. for possible intervention. 2. Acute blood loss anemia: Due to #1, will monitor H&H, transfuse as needed. 3. Mild hypotension: Improved with IV fluids, continue IV fluids. 4. Possible near syncope due to #3, telemetry monitoring. 5. Diabetes mellitus type 2: Hold metformin, start SSI. 6. Dyslipidemia: Continue statin  7. DVT/GI Prophylaxis: SCD's  8. Full code    Discussed with patient at bedside about hospital admission and my plan care, he understood and agree with my plan care. I spent 60 minutes with the patient in face-to-face consultation, of which greater than 50% was spent in counseling and coordination of care as described above      Francesca Hayes MD  6/11/2020 5:21 AM    Disclaimer: Sections of this note are dictated using utilizing voice recognition software. Minor typographical errors may be present. If questions arise, please do not hesitate to contact me or call our department.

## 2020-06-11 NOTE — PROCEDURES
[facility], [address]    Procedure Note    Patient: Brandi John MRN: [id number]  SSN: [id number]    YOB: 1960  Age: 60 y.o. Sex: male      Date/Time:  6/11/2020 5:52 PM    Esophagogastroduodenoscopy (EGD) Procedure Note    Procedure: Esophagogastroduodenoscopy with biopsy    Impression:    -multiple small shallow clean based duodenal ulcers     Recommendations:  1. Check antral bxs for HP and rx if needed 2. PPI daily for 2 months 3. Avoid nsaids     Indication:  Melena/hematochezia  Pre-operative Diagnosis: see indication above  Post-operative Diagnosis: see findings below  :  Sebastien Sheridan MD  Referring Provider:   Phong, MD Casandra    Exam:  Airway: clear, no airway problems anticipated  Heart: RRR, without gallops or rubs  Lungs: clear bilaterally without wheezes, crackles, or rhonchi  Abdomen: soft, nontender, nondistended, bowel sounds present  Mental Status: awake, alert and oriented to person, place and time     Anethesia/Sedation:  MAC anesthesia Propofol  Procedure Details   After informed consent was obtained for the procedure, with all risks and benefits of procedure explained the patient was taken to the endoscopy suite and placed in the left lateral decubitus position. Following sequential administration of sedation as per above, the [id number] gastroscope was inserted into the mouth and advanced under direct vision to third portion of the duodenum. A careful inspection was made as the gastroscope was withdrawn, including a retroflexed view of the proximal stomach; findings and interventions are described below. Findings: ,ultiple shallow clean based small duodenal ulcers with clean bases      Therapies:  none  Specimens: antral gastric bxs   Estimated blood loss:  None   Surgical assistants none  Implants none            Complications:   None; patient tolerated the procedure well.     Discharge disposition:  To ariana Villanueva MD

## 2020-06-11 NOTE — PERIOP NOTES
TRANSFER - OUT REPORT:    Verbal report given to Sari Pelletier RN on Tracee Gilbert  being transferred to  for routine post - op       Report consisted of patients Situation, Background, Assessment and   Recommendations(SBAR). Information from the following report(s) SBAR and MAR was reviewed with the receiving nurse. Lines:   Peripheral IV 06/10/20 Right Antecubital (Active)   Site Assessment Clean, dry, & intact 6/11/2020 11:45 AM   Phlebitis Assessment 0 6/11/2020 11:45 AM   Infiltration Assessment 0 6/11/2020 11:45 AM   Dressing Status Clean, dry, & intact 6/11/2020 11:45 AM   Dressing Type Transparent 6/11/2020 11:45 AM   Hub Color/Line Status Pink; Infusing 6/11/2020 11:45 AM   Action Taken Open ports on tubing capped 6/11/2020  8:51 AM   Alcohol Cap Used Yes 6/11/2020  8:51 AM        Opportunity for questions and clarification was provided.       Patient transported with:   [employer]

## 2020-06-11 NOTE — ED NOTES
TRANSFER - OUT REPORT:    Verbal report given to Shelton [facility](name[facility] ([facility]) for routine progression of care       Report consisted of patients Situation, Background, Assessment and   Recommendations(SBAR). Information from the following report(s) ED Summary was reviewed with the receiving nurse. Lines:   Peripheral IV 06/10/20 Right Antecubital (Active)        Opportunity for questions and clarification was provided.       Patient transported with:   Monitor

## 2020-06-11 NOTE — ANESTHESIA POSTPROCEDURE EVALUATION
Procedure(s):  ENDOSCOPY with biopsies. MAC    Anesthesia Post Evaluation      Multimodal analgesia: multimodal analgesia used between 6 hours prior to anesthesia start to PACU discharge  Patient location during evaluation: bedside  Patient participation: complete - patient participated  Level of consciousness: awake  Pain management: adequate  Airway patency: patent  Anesthetic complications: no  Cardiovascular status: stable  Respiratory status: acceptable  Hydration status: acceptable  Post anesthesia nausea and vomiting:  controlled      INITIAL Post-op Vital signs:   Vitals Value Taken Time   /58 6/11/2020  6:33 PM   Temp 36.8 °C (98.2 °F) 6/11/2020  6:04 PM   Pulse 66 6/11/2020  6:36 PM   Resp 18 6/11/2020  6:36 PM   SpO2 100 % 6/11/2020  6:36 PM   Vitals shown include unvalidated device data.

## 2020-06-11 NOTE — CONSULTS
[email or web address]  [phone]    GASTROENTEROLOGY CONSULT      Impression:   1. Upper GI bleed - dark stool x3 yesterday  --CT a/p with no acute findings  2. Anemia - due to #1, initial H/H 12.1/35.9 now 10.9/32.4  3. 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 DM2  5. MICHELLE      Plan:     1. Maintain NPO for EGD this AM. All risks, benefits, and alternatives discussed and patient agrees to proceed  2. Continue PPI  3. Monitor H/H and transfuse as per protocol  4. Medical management as per primary team      Chief Complaint: dark stools, pre-syncopal episode      HPI:  Mariann Schulz is a 60 y.o. male who I am being asked to see in consultation for an opinion regarding dark stool x 3 yesterday with pre-syncopal episode. Heme + stool in ER with initial H/H 12.1/35.9 now down to 10.9/32.4. Denies abdominal pain, reflux, odynophagia, dysphagia, nausea, or vomiting. No lower GI complaints. 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 No smoking, ETOH, or NSAID use. No previous EGDs or known ulcers. Last colonoscopy 2017 with history of polyps.      PMH:   Past Medical History:   Diagnosis Date    Abscess of sigmoid colon     CPAP (continuous positive airway pressure) dependence     Diabetes (HCC)     High cholesterol     MICHELLE (obstructive sleep apnea)        PSH:   Past Surgical History:   Procedure Laterality Date    HX GI      sigmoidectomy       Social HX:   Social History     Socioeconomic History    Marital status:      Spouse name: Not on file    Number of children: Not on file    Years of education: Not on file    Highest education level: Not on file   Occupational History    Not on file   Social Needs    Financial resource strain: Not on file    Food insecurity     Worry: Not on file     Inability: Not on file    Transportation needs     Medical: Not on file     Non-medical: Not on file   Tobacco Use    Smoking status: Never Smoker    Smokeless tobacco: Never Used   Substance and Sexual Activity    Alcohol use: Yes    Drug use: Never    Sexual activity: Not on file   Lifestyle    Physical activity     Days per week: Not on file     Minutes per session: Not on file    Stress: Not on file   Relationships    Social connections     Talks on phone: Not on file     Gets together: Not on file     Attends Yazidism service: Not on file     Active member of club or organization: Not on file     Attends meetings of clubs or organizations: Not on file     Relationship status: Not on file    Intimate partner violence     Fear of current or ex partner: Not on file     Emotionally abused: Not on file     Physically abused: Not on file     Forced sexual activity: Not on file   Other Topics Concern    Not on file   Social History Narrative    Not on file       FHX:   History reviewed. No pertinent family history. Allergy:   No Known Allergies    Patient Active Problem List   Diagnosis Code    GI bleeding K92.2    GI bleed K92.2       Home Medications:     Medications Prior to Admission   Medication Sig    metFORMIN (GLUCOPHAGE) 500 mg tablet Take 500 mg by mouth two (2) times daily (with meals).  atorvastatin (LIPITOR) 10 mg tablet Take 5 mg by mouth daily. Review of Systems:     Constitutional: No fevers, chills, weight loss, fatigue. Skin: No rashes, pruritis, jaundice, ulcerations, erythema. HENT: No headaches, nosebleeds, sinus pressure, rhinorrhea, sore throat. Eyes: No visual changes, blurred vision, eye pain, photophobia, jaundice. Cardiovascular: No chest pain, heart palpitations. Respiratory: No cough, SOB, wheezing, chest discomfort, orthopnea. Gastrointestinal: Dark stool, no abdominal pain, nausea, vomiting, or BRBPR   Genitourinary: No dysuria, bleeding, discharge, pyuria. Musculoskeletal: No weakness, arthralgias, wasting. Endo: No sweats. Heme: No bruising, easy bleeding. Allergies: As noted. Neurological: Cranial nerves intact. Alert and oriented. Gait not assessed. Psychiatric:  No anxiety, depression, hallucinations. Visit Vitals  /70 (BP 1 Location: Left arm)   Pulse 61   Temp (P) 97.7 °F (36.5 °C)   Resp 16   Ht 6' (1.829 m)   Wt 103.4 kg (228 lb)   SpO2 96%   BMI 30.92 kg/m²       Physical Assessment:     constitutional: appearance: well developed, well nourished, normal habitus, no deformities, in no acute distress. skin: inspection: no rashes, ulcers, icterus or other lesions; no clubbing or telangiectasias. eyes: inspection: normal conjunctivae and lids; no jaundice pupils: normal  ENMT: mouth: normal oral mucosa,lips and gums; good dentition. neck: thyroid: normal size, consistency and position; no masses or tenderness. respiratory: effort: normal chest excursion; no intercostal retraction or accessory muscle use. cardiovascular: normal rhythm; no thrill or murmurs. abdominal: abdomen: normal consistency; no tenderness or masses. hernias: no hernias appreciated. Multiple scars consistent with surgical history, mesh at previous loop colostomy scar liver: normal size and consistency. spleen: not palpable. rectal: hemoccult/guaiac: not performed. musculoskeletal:  normal range of motion; no pain, deformity or contracture. neurologic: cranial nerves: II-XII normal. Pupils intact. psychiatric: judgement/insight: within normal limits. memory: within normal limits for recent and remote events. mood and affect: no evidence of depression, anxiety or agitation. orientation: oriented to time, space and person.         Basic Metabolic Profile   Recent Labs     06/10/20  1736      K 3.6    CO2 25   BUN 31*   *   CA 8.4*         CBC w/Diff    Recent Labs     06/11/20  0120   WBC 9.8   RBC 3.68*   HGB 10.9*   HCT 32.4*   MCV 88.0   MCH 29.6   MCHC 33.6   RDW 12.0       Recent Labs     06/11/20  0120   GRANS 64   LYMPH 26   EOS 1        Hepatic Function   Recent Labs     06/10/20  1736   ALB 3.4   TP 6.2*   TBILI 0.4   AP 83        Coags   Recent Labs     06/10/20  1736   PTP 14.0   INR 1.1           Latasha Velez NP. [facility], [address] [place] [address]  Cell: [phone]  [email or web address]

## 2020-06-11 NOTE — ED NOTES
Orthostatic BP's charted. Pt denied feeling lightheadedness/dizziness upon standing, which he stated was unusual as he has felt dizziness with standing recently.

## 2020-06-11 NOTE — ANESTHESIA PREPROCEDURE EVALUATION
Relevant Problems   No relevant active problems       Anesthetic History   No history of anesthetic complications            Review of Systems / Medical History  Patient summary reviewed, nursing notes reviewed and pertinent labs reviewed    Pulmonary        Sleep apnea: CPAP           Neuro/Psych              Cardiovascular              Hyperlipidemia    Exercise tolerance: >4 METS     GI/Hepatic/Renal                Endo/Other    Diabetes    Morbid obesity     Other Findings              Physical Exam    Airway  Mallampati: III  TM Distance: 4 - 6 cm  Neck ROM: normal range of motion   Mouth opening: Normal     Cardiovascular  Regular rate and rhythm,  S1 and S2 normal,  no murmur, click, rub, or gallop  Rhythm: regular  Rate: normal         Dental  No notable dental hx       Pulmonary  Breath sounds clear to auscultation               Abdominal  GI exam deferred       Other Findings            Anesthetic Plan    ASA: 3  Anesthesia type: MAC          Induction: Intravenous  Anesthetic plan and risks discussed with: Patient

## 2020-06-12 VITALS
TEMPERATURE: 98.3 F | BODY MASS INDEX: 31.27 KG/M2 | DIASTOLIC BLOOD PRESSURE: 79 MMHG | WEIGHT: 230.9 LBS | RESPIRATION RATE: 18 BRPM | OXYGEN SATURATION: 97 % | SYSTOLIC BLOOD PRESSURE: 138 MMHG | HEART RATE: 69 BPM | HEIGHT: 72 IN

## 2020-06-12 LAB
ANION GAP SERPL CALC-SCNC: 6 MMOL/L (ref 3–18)
BASOPHILS # BLD: 0 K/UL (ref 0–0.1)
BASOPHILS NFR BLD: 0 % (ref 0–2)
BUN SERPL-MCNC: 13 MG/DL (ref 7–18)
BUN/CREAT SERPL: 13 (ref 12–20)
CALCIUM SERPL-MCNC: 7.8 MG/DL (ref 8.5–10.1)
CHLORIDE SERPL-SCNC: 110 MMOL/L (ref 100–111)
CO2 SERPL-SCNC: 26 MMOL/L (ref 21–32)
CREAT SERPL-MCNC: 0.98 MG/DL (ref 0.6–1.3)
DIFFERENTIAL METHOD BLD: ABNORMAL
EOSINOPHIL # BLD: 0.1 K/UL (ref 0–0.4)
EOSINOPHIL NFR BLD: 2 % (ref 0–5)
ERYTHROCYTE [DISTWIDTH] IN BLOOD BY AUTOMATED COUNT: 12.1 % (ref 11.6–14.5)
GLUCOSE BLD STRIP.AUTO-MCNC: 141 MG/DL (ref 70–110)
GLUCOSE BLD STRIP.AUTO-MCNC: 189 MG/DL (ref 70–110)
GLUCOSE BLD STRIP.AUTO-MCNC: 242 MG/DL (ref 70–110)
GLUCOSE SERPL-MCNC: 138 MG/DL (ref 74–99)
HCT VFR BLD AUTO: 29 % (ref 36–48)
HGB BLD-MCNC: 9.9 G/DL (ref 13–16)
LYMPHOCYTES # BLD: 1.4 K/UL (ref 0.9–3.6)
LYMPHOCYTES NFR BLD: 22 % (ref 21–52)
MAGNESIUM SERPL-MCNC: 1.9 MG/DL (ref 1.6–2.6)
MCH RBC QN AUTO: 29.5 PG (ref 24–34)
MCHC RBC AUTO-ENTMCNC: 34.1 G/DL (ref 31–37)
MCV RBC AUTO: 86.3 FL (ref 74–97)
MONOCYTES # BLD: 0.8 K/UL (ref 0.05–1.2)
MONOCYTES NFR BLD: 12 % (ref 3–10)
NEUTS SEG # BLD: 3.9 K/UL (ref 1.8–8)
NEUTS SEG NFR BLD: 64 % (ref 40–73)
PLATELET # BLD AUTO: 189 K/UL (ref 135–420)
PMV BLD AUTO: 10 FL (ref 9.2–11.8)
POTASSIUM SERPL-SCNC: 3.6 MMOL/L (ref 3.5–5.5)
RBC # BLD AUTO: 3.36 M/UL (ref 4.7–5.5)
SODIUM SERPL-SCNC: 142 MMOL/L (ref 136–145)
WBC # BLD AUTO: 6.2 K/UL (ref 4.6–13.2)

## 2020-06-12 PROCEDURE — 74011636637 HC RX REV CODE- 636/637: Performed by: HOSPITALIST

## 2020-06-12 PROCEDURE — 83735 ASSAY OF MAGNESIUM: CPT

## 2020-06-12 PROCEDURE — 85025 COMPLETE CBC W/AUTO DIFF WBC: CPT

## 2020-06-12 PROCEDURE — 80048 BASIC METABOLIC PNL TOTAL CA: CPT

## 2020-06-12 PROCEDURE — 36415 COLL VENOUS BLD VENIPUNCTURE: CPT

## 2020-06-12 PROCEDURE — 74011250637 HC RX REV CODE- 250/637: Performed by: INTERNAL MEDICINE

## 2020-06-12 PROCEDURE — 82962 GLUCOSE BLOOD TEST: CPT

## 2020-06-12 PROCEDURE — 74011250637 HC RX REV CODE- 250/637: Performed by: HOSPITALIST

## 2020-06-12 RX ORDER — PANTOPRAZOLE SODIUM 40 MG/1
40 TABLET, DELAYED RELEASE ORAL
Qty: 30 TAB | Refills: 0 | Status: SHIPPED | OUTPATIENT
Start: 2020-06-13

## 2020-06-12 RX ADMIN — INSULIN LISPRO 4 UNITS: 100 INJECTION, SOLUTION INTRAVENOUS; SUBCUTANEOUS at 11:49

## 2020-06-12 RX ADMIN — PANTOPRAZOLE SODIUM 40 MG: 40 TABLET, DELAYED RELEASE ORAL at 08:36

## 2020-06-12 RX ADMIN — ATORVASTATIN CALCIUM 5 MG: 10 TABLET, FILM COATED ORAL at 08:38

## 2020-06-12 NOTE — PROGRESS NOTES
Reason for Admission:  GI bleeding [K92.2]  GI bleed [K92.2]                 RRAT Score:    9            Plan for utilizing home health:    no                      Likelihood of Readmission:   LOW                         Transition of Care Plan:              Initial assessment completed with patient. Cognitive status of patient: oriented to time, place, person and situation. Face sheet information confirmed:  yes. The patient designates Tonio Reinoso to participate in his discharge plan and to receive any needed information. This patient lives in a single family home with patient and spouse. Patient is able to navigate steps as needed. Prior to hospitalization, patient was considered to be independent with ADLs/IADLS : yes . Patient has a current ACP document on file: no  The patient and friend will be available to transport patient home upon discharge. The patient already has none reported, and CPAP medical equipment available in the home. Patient is not currently active with home health. .  Patient has not stayed in a skilled nursing facility or rehab. Was  stay within last 60 days : no. This patient is on dialysis :no       Freedom of choice signed: no. Currently, the discharge plan is Home. The patient states that he can obtain his medications from the pharmacy, and take his medications as directed. Patient's current insurance is [employer]     PATIENT IS A  [employer] AND IS STAYING AT THE [address]. WILL BE HERE FOR 2 TO 3 MORE MONTHS.         Care Management Interventions  PCP Verified by CM: Yes(Needs local pcp)  Mode of Transport at Discharge: Self  Current Support Network: Lives with Spouse  Confirm Follow Up Transport: Family  The Plan for Transition of Care is Related to the Following Treatment Goals : home  Discharge Location  Discharge Placement: Home        Albino Junior RN BSN  Care Manager  [phone]

## 2020-06-12 NOTE — PROGRESS NOTES
[email or web address]  [phone]    Gastroenterology follow up-Progress note    Impression:  1. Upper GI bleed - dark stool x3 6/10/20  --CT a/p with no acute findings  --EGD 6/11/20 notable for multiple small shallow clean based duodenal ulcers  2. Anemia - due to #1, initial H/H 12.1/35.9 now 9.9/29.0  3. 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 DM2  5. MICHELLE    Plan:  1. Continue PPI daily for 2 months  2. Avoid NSAIDs  3. Awaiting H. Pylori pathology results - will treat if positive  4. Monitor H/H and transfuse as per protocol  5. Medical management as per primary team  6. Patient not local and will need follow up with PCP in home state. Please provide him our office number to provide details of care while inpatient if needed. 7. Will sign off-Thank you for this consultation and the opportunity to participate in the care of this patient. Please do not hesitate to call with any questions or concerns, or should event occur that may necessitate additional GI evaluation. Chief Complaint: dark stools      Subjective: single dark stool early this AM, denies abdominal pain, hematemesis, nausea, vomiting, tolerating diet    ROS: Denies any fevers, chills, rash.      Eyes: conjunctiva normal, EOM normal   Neck: ROM normal, supple and trachea normal   Cardiovascular: heart normal, normal rate and regular rhythm   Pulmonary/Chest Wall: breath sounds normal and effort normal   Abdominal: appearance normal, bowel sounds normal and soft, non-acute, non-tender     Patient Active Problem List   Diagnosis Code    GI bleeding K92.2    GI bleed K92.2         Visit Vitals  /72 (BP 1 Location: Left arm, BP Patient Position: At rest)   Pulse 66   Temp 98.6 °F (37 °C)   Resp 20   Ht 6' (1.829 m)   Wt 104.7 kg (230 lb 14.4 oz)   SpO2 94%   BMI 31.32 kg/m²           Intake/Output Summary (Last 24 hours) at 6/12/2020 0801  Last data filed at 6/12/2020 0322  Gross per 24 hour   Intake 1352.08 ml   Output 1700 ml   Net -347.92 ml       CBC w/Diff    Lab Results   Component Value Date/Time    WBC 6.2 06/12/2020 05:20 AM    RBC 3.36 (L) 06/12/2020 05:20 AM    HGB 9.9 (L) 06/12/2020 05:20 AM    HCT 29.0 (L) 06/12/2020 05:20 AM    MCV 86.3 06/12/2020 05:20 AM    MCH 29.5 06/12/2020 05:20 AM    MCHC 34.1 06/12/2020 05:20 AM    RDW 12.1 06/12/2020 05:20 AM     06/12/2020 05:20 AM    Lab Results   Component Value Date/Time    GRANS 64 06/12/2020 05:20 AM    LYMPH 22 06/12/2020 05:20 AM    EOS 2 06/12/2020 05:20 AM    BASOS 0 06/12/2020 05:20 AM      Basic Metabolic Profile   Recent Labs     06/12/20  0520      K 3.6      CO2 26   BUN 13   CA 7.8*   MG 1.9        Hepatic Function    Lab Results   Component Value Date/Time    ALB 3.4 06/10/2020 05:36 PM    TP 6.2 (L) 06/10/2020 05:36 PM    AP 83 06/10/2020 05:36 PM    No results found for: TBIL       Coags   Recent Labs     06/10/20  1736   PTP 14.0   INR 1.1               Jillian Lopez, NP    [facility]. [facility] [place]  Phone: [phone]  Pager: [phone]

## 2020-06-12 NOTE — DISCHARGE INSTRUCTIONS
Patient Education        Upper GI Endoscopy in Children: What to Expect at [address] child had an upper GI endoscopy. Your doctor used a thin, lighted tube that bends to look at the inside of your child's esophagus, stomach, and the first part of the small intestine, called the duodenum. Bernadette Ballesteros probably be able to take your child home after his or her medicine wears off. This takes 1 to 2 hours. Your child may have a sore throat for a day or two after the test.  This care sheet gives you a general idea about how long it will take for your child to recover. But each child recovers at a different pace. Follow the steps below to help your child get better as quickly as possible. How can you care for your child at home? Activity  · Help your child rest as much as needed after going home. · Your child should be able to go back to his or her usual activities the day after the test.  Diet  · Follow your doctor's directions for eating. · Be sure that your child drinks plenty of fluids (unless your doctor has said not to). Medicines  · Your doctor will tell you if and when your child can restart his or her medicines. The doctor will also give you instructions about your child taking any new medicines. · Ask your doctor if you can give your child acetaminophen (Tylenol), a throat spray, or a throat lozenge if your child has a sore throat. Read and follow all instructions on the label. Do not give aspirin to anyone younger than 20. It has been linked to Reye syndrome, a serious illness. Follow-up care is a key part of your child's treatment and safety. Be sure to make and go to all appointments, and call your doctor if your child is having problems. It's also a good idea to know your child's test results and keep a list of the medicines your child takes. When should you call for help? [id number] anytime you think your child may need emergency care.  For example, call if:  · Your child passes out (loses consciousness). · Your child has trouble breathing. · Your child passes maroon or bloody stools. Call your doctor now or seek immediate medical care if:  · Your child has pain that does not get better after taking pan medicine. · Your child has new or worse belly pain. · Your child has blood in his or her stools. · Your child cannot pass stools or gas. · Your child has a fever. · Your child is sick to his or her stomach and cannot hold down fluids. Watch closely for changes in your child's health, and be sure to contact your doctor if:  · Your child's throat still hurts after a day or two. Where can you learn more? Go to http://sloane-tg.info/  Enter D276 in the search box to learn more about \"Upper GI Endoscopy in Children: What to Expect at Home. \"  Current as of: August 12, 2019               Content Version: 12.5  © 3800-5230 [employer]. Care instructions adapted under license by [employer] (which disclaims liability or warranty for this information). If you have questions about a medical condition or this instruction, always ask your healthcare professional. [place] [address] any warranty or liability for your use of this information. Patient Education        Upper GI Endoscopy: Before Your Child's Procedure  What is an upper GI endoscopy? An upper gastrointestinal (or GI) endoscopy is a test that allows your doctor to look at the inside of your child's esophagus, stomach, and the first part of the small intestine, called the duodenum. The esophagus is the tube that carries food to the stomach. The doctor uses a thin, lighted tube that bends. It is called an endoscope, or scope. The scope is a flexible video camera. The doctor looks at a monitor (like a TV set or a computer screen) as he or she moves the scope. The doctor puts the tip of the scope in your child's mouth and gently moves it down the throat.  A doctor may do this procedure to look for the cause of belly pain or bleeding. It also can be used to look for signs of acid backing up into the esophagus. This is called gastroesophageal reflux disease, or GERD. The doctor can use the scope to take a sample of tissue for study (a biopsy). The doctor also can use the scope to take out growths or stop bleeding. You can take your child home after your doctor checks to make sure your child is not having any problems. Your child may stay overnight if your doctor did a biopsy or treatment during the test.  Follow-up care is a key part of your child's treatment and safety. Be sure to make and go to all appointments, and call your doctor if your child is having problems. It's also a good idea to know your child's test results and keep a list of the medicines your child takes. How do you prepare for the procedure? Procedures can be stressful for both your child and you. This information will help you understand what you can expect. And it will help you safely prepare for your child's procedure. Preparing for the procedure  · Talk to your child about the procedure. Tell your child that the endoscopy will help find what's causing problems in your child's belly. Hospitals know how to take care of children. The staff will do all they can to make it easier for your child. · Plan for your child's recovery time. He or she may need more of your time right after the procedure, both for care and for comfort. · Understand exactly what procedure is planned, along with the risks, benefits, and other options. · Tell the doctor ALL the medicines, vitamins, supplements, and herbal remedies your child takes. Some may increase the risk of problems during the procedure. Your doctor will tell you if your child should stop taking any of them before the procedure and how soon to do it. The day before the procedure  · A nurse may call you (or you may need to call the hospital).  This is to confirm the time and date of your child's procedure and answer any questions. · Remember to follow your doctor's instructions about your child taking or stopping medicines before the procedure. This includes over-the-counter medicines. What happens on the day of the procedure? · Follow the instructions exactly about when your child should stop eating and drinking. If you don't, the procedure may be canceled. If the doctor told you to have your child take his or her medicines on the day of the procedure, have your child take them with only a sip of water. · Have your child take a bath or shower before you come in. Do not apply lotion or deodorant. · Your child may brush his or her teeth. But tell your child not to swallow any toothpaste or water. · Do not let your child wear contact lenses. Bring your child's glasses or contact lens case. · Be sure your child has something that reminds him or her of home. A special stuffed animal, toy, or blanket may be comforting. For an older child, it might be a book or music. At the hospital or clinic  · A parent or legal guardian must accompany your child. · Your child will be kept comfortable and safe by the anesthesia provider. The doctor may spray medicine on the back of your child's throat to numb it. Your child also will get medicine to prevent pain and help him or her relax. Some children find that they do not remember having the test because of the medicine. · The procedure usually takes 15 to 30 minutes. · After the procedure, your child will be taken to the recovery room. As your child wakes up, the recovery room staff will monitor his or her condition. The doctor will talk to you about the procedure. · You will probably be able to take your child home about 1 to 2 hours after the procedure. · Your child will lie on the left side. The doctor will put the scope in your child's mouth and toward the back of the throat.  The doctor will tell your child when to swallow. This helps the scope move down the throat. Your child will be able to breathe normally. The doctor will move the scope down the esophagus into the stomach. The doctor also may look at the duodenum. · If your doctor wants to take a sample of tissue for a biopsy, he or she may use small surgical tools, which are put into the scope, to cut off some tissue. Your child will not feel a biopsy. The doctor also can use the tools to stop bleeding or to do other treatments. When should you call your doctor? · You have questions or concerns. · You don't understand how to prepare your child for the procedure. · Your child becomes ill before the procedure (such as fever, flu, or a cold). · You need to reschedule or have changed your mind about your child having the procedure. Where can you learn more? Go to http://sloane-tg.info/  Enter T399 in the search box to learn more about \"Upper GI Endoscopy: Before Your Child's Procedure. \"  Current as of: August 12, 2019               Content Version: 12.5  © 3279-0746 [employer] Care instructions adapted under license by [employer] (which disclaims liability or warranty for this information). If you have questions about a medical condition or this instruction, always ask your healthcare professional. [address] any warranty or liability for your use of this information. DISCHARGE SUMMARY from Nurse    PATIENT INSTRUCTIONS:    After general anesthesia or intravenous sedation, for 24 hours or while taking prescription Narcotics:  · Limit your activities  · Do not drive and operate hazardous machinery  · Do not make important personal or business decisions  · Do  not drink alcoholic beverages  · If you have not urinated within 8 hours after discharge, please contact your surgeon on call.     Report the following to your surgeon:  · Excessive pain, swelling, redness or odor of or around the surgical area  · Temperature over 100.5  · Nausea and vomiting lasting longer than 4 hours or if unable to take medications  · Any signs of decreased circulation or nerve impairment to extremity: change in color, persistent  numbness, tingling, coldness or increase pain  · Any questions    What to do at Home:  Recommended activity: Activity as tolerated,     If you experience any of the following symptoms Black and Tarry stools, Overt Bleeding Chest Pain, Dizziness, Fainting , Fever greater than 100.4 Nausea And Vomiting lasting more than 4 hours   please follow up with Primary Care Provider or go to the nearest Emergency Room. *  Please give a list of your current medications to your Primary Care Provider. *  Please update this list whenever your medications are discontinued, doses are      changed, or new medications (including over-the-counter products) are added. *  Please carry medication information at all times in case of emergency situations. These are general instructions for a healthy lifestyle:    No smoking/ No tobacco products/ Avoid exposure to second hand smoke  Surgeon General's Warning:  Quitting smoking now greatly reduces serious risk to your health. Obesity, smoking, and sedentary lifestyle greatly increases your risk for illness    A healthy diet, regular physical exercise & weight monitoring are important for maintaining a healthy lifestyle    You may be retaining fluid if you have a history of heart failure or if you experience any of the following symptoms:  Weight gain of 3 pounds or more overnight or 5 pounds in a week, increased swelling in our hands or feet or shortness of breath while lying flat in bed. Please call your doctor as soon as you notice any of these symptoms; do not wait until your next office visit. The discharge information has been reviewed with the patient. The patient verbalized understanding.   Discharge medications reviewed with the patient and appropriate educational materials and side effects teaching were provided. [employer] Activation    Thank you for requesting access to DNage. Please follow the instructions below to securely access and download your online medical record. DNage allows you to send messages to your doctor, view your test results, renew your prescriptions, schedule appointments, and more. How Do I Sign Up? 1. In your internet browser, go to [email or web address]  2. Click on the First Time User? Click Here link in the Sign In box. You will be redirect to the New Member Sign Up page. 3. Enter your DNage Access Code exactly as it appears below. You will not need to use this code after youve completed the sign-up process. If you do not sign up before the expiration date, you must request a new code. DNage Access Code: [email or web address]  Expires: 2020  6:53 PM (This is the date your DNage access code will )    4. Enter the last four digits of your Social Security Number (xxxx) and Date of Birth (mm/dd/yyyy) as indicated and click Submit. You will be taken to the next sign-up page. 5. Create a DNage ID. This will be your DNage login ID and cannot be changed, so think of one that is secure and easy to remember. 6. Create a DNage password. You can change your password at any time. 7. Enter your Password Reset Question and Answer. This can be used at a later time if you forget your password. 8. Enter your e-mail address. You will receive e-mail notification when new information is available in [address] 9. Click Sign Up. You can now view and download portions of your medical record. 10. Click the Download Summary menu link to download a portable copy of your medical information. Additional Information    If you have questions, please visit the Frequently Asked Questions section of the DNage website at [email or web address] Remember, DNage is NOT to be used for urgent needs. For medical emergencies, dial 911.       Patient armband removed and shredded    ___________________________________________________________________________________________________________________________________

## 2020-06-12 NOTE — ROUTINE PROCESS
Bedside and verbal report received from [address] [place] [address] (offgoing nurse). Report included the following information; SBAR, MAR, LABS, Intake/output, Kardex, and summary of care. Patient resting in bed talking on phone and watching TV. Patient alert with the call light and phone in reach. Will continue to monitor. [id number]  Patient had a stool accident on the bed leading to the bathroom. Patient is alert, but he stated \"it happen so fast\". Patient in the bathroom bathing. Bed changed. Will continue to monitor.

## 2020-06-12 NOTE — PROGRESS NOTES
conducted an initial consultation and Spiritual Assessment for Denys Jj, who is a 60 y.o.,male. Patients Primary Language is: [place]. According to the patients EMR Jew Affiliation is: No Mosque. The reason the Patient came to the hospital is:   Patient Active Problem List    Diagnosis Date Noted    GI bleeding 06/11/2020    GI bleed 06/11/2020        The  provided the following Interventions:  Initiated a relationship of care and support. Provided information about Spiritual Care Services. Chart reviewed. The following outcomes where achieved:  Patient expressed gratitude for 's visit. Assessment:  Patient does not have any Oriental orthodox/cultural needs that will affect patients preferences in health care. Plan:  Chaplains will continue to follow and will provide pastoral care on an as needed/requested basis.  recommends bedside caregivers page  on duty if patient shows signs of acute spiritual or emotional distress.     [address]  [phone])

## 2020-06-12 NOTE — DISCHARGE SUMMARY
Discharge Summary     Patient ID:  Keara Salter  [id number]  20 y.o.  1960  Body mass index is 31.32 kg/m². PCP on record: Casandra Darling MD    Admit date: 6/10/2020  Discharge date and time: 6/12/2020    Discharge Diagnoses:                                           1 GI bleed, suspect upper source  2 Acute blood loss anemia secondary to above - H/h stable   3 Hypotension secondary to acute blood loss - hypovolumic shock - now stable   4 Near syncope  5 Dyslipidemia  6 DM 2          Consults: GI          Hospital Course by problems:    Admitted with hypotension , symptomatic anemia from acute blood loss anemia , s/p EGD ( findings posted below ) , shallow ulcer noted . PPI and out patient follow up with GI for H pylorie pending test and further management. PPI for 2 months after that PPI use per GI       Continue other management per pre hospitalization meds . Full CODE       Patient seen and examined by me on discharge day. Pertinent Findings:  Patient is Alert Awake and oriented   HEENT - NAD    RS - Clear , no rales no rhonchi   CVS - regular rhythm and rate acceptable    abd - benign, BS present , no Distension   EXT - no edema , no calf tenderness   Neuro - alert and awake , grossly motor and sensory intact       Significant Diagnostic Studies:  Results   CT ABD PELV W CONT (Accession [id number]) (Order [id number])   Allergies      No Known Allergies   Exam Information     Status Exam Begun  Exam Ended    Final [99] 6/10/2020 18:56 6/10/2020 19:36   Result Information     Status: Final result (Exam End: 6/10/2020 19:36) Provider Status: Open   Study Result     EXAM: CT ABD PELV W CONT     CLINICAL INDICATION/HISTORY: abdominal pain, nausea, melena; hx of multiple  surgeries in the past     TECHNIQUE: Contiguous axial images were obtained through the abdomen and pelvis. From these, sagittal and coronal reconstructions were generated.   Contrast used: 100 cc Isovue-300     CT scans at this facility are performed using dose optimization technique as  appropriate with performed exam, to include automated exposure control,  adjustment of mA and/or kV according to patient's size (including appropriate  matching for site-specific examinations), or use of iterative reconstruction  technique.     COMPARISON: None     FINDINGS:           Lower chest: No acute findings. Small less than 5 mm nodular focus along the  left hemidiaphragm laterally.     Liver: Unremarkable      Gallbladder/biliary: Unremarkable     Spleen: Unremarkable     Pancreas: Unremarkable     Left Kidney: No stones or hydronephrosis. Small rounded cystic hypodensity at  the medial aspect.     Right kidney: No stones or hydronephrosis. Small cystic hypodensity at the  anterior aspect.     Adrenals: Unremarkable     Bowels/mesentery: No obstruction or mesenteric inflammation. Surgical ligature  at the rectosigmoid junction. Appendix: Not specifically identified, no regional inflammation.     Peritoneal Spaces: No intraperitoneal free air. No free fluid. No fluid  collection.     Urinary bladder: Unremarkable      Pelvic organs: Unremarkable     Vascular: Aorta unremarkable for age. IVC unremarkable.     Lymph Nodes: No adenopathy.     Bones: No acute findings. Unremarkable for age.     IMPRESSION  IMPRESSION[de-identified]     1. No acute findings within the abdomen or pelvis  -Details and incidentals as above     EGD:     Poonam Torres MD   Physician   Gastroenterology   Procedures       Signed   Date of Service:  06/11/20 1752            Pre-procedure Diagnoses   Upper GI bleeding [K92.2]           []Hide copied text    []Shanique for details          [place]  [facility], [address]     Procedure Note     Patient: Yarelis Kelley MRN: [id number]  SSN: [id number]    YOB: 1960  Age: 60 y.o.   Sex: male       Date/Time:      6/11/2020 5:52 PM     Esophagogastroduodenoscopy (EGD) Procedure Note     Procedure: Esophagogastroduodenoscopy with biopsy     Impression:    -multiple small shallow clean based duodenal ulcers      Recommendations:  1. Check antral bxs for HP and rx if needed 2. PPI daily for 2 months 3. Avoid nsaids      Indication:       Melena/hematochezia  Pre-operative Diagnosis: see indication above  Post-operative Diagnosis: see findings below  :  Martín Holley MD  Referring Provider:   Other, MD Casandra     Exam:  Airway: clear, no airway problems anticipated  Heart: RRR, without gallops or rubs  Lungs: clear bilaterally without wheezes, crackles, or rhonchi  Abdomen: soft, nontender, nondistended, bowel sounds present  Mental Status: awake, alert and oriented to person, place and time      Anethesia/Sedation:  MAC anesthesia Propofol  Procedure Details   After informed consent was obtained for the procedure, with all risks and benefits of procedure explained the patient was taken to the endoscopy suite and placed in the left lateral decubitus position. Following sequential administration of sedation as per above, the [id number] gastroscope was inserted into the mouth and advanced under direct vision to third portion of the duodenum. A careful inspection was made as the gastroscope was withdrawn, including a retroflexed view of the proximal stomach; findings and interventions are described below. Findings: ,ultiple shallow clean based small duodenal ulcers with clean bases       Therapies:  none  Specimens: antral gastric bxs   Estimated blood loss:  None   Surgical assistants none  Implants none            Complications:   None; patient tolerated the procedure well.     Discharge disposition:  To lane  Anoop Norman MD               Pertinent Lab Data:  Recent Labs     06/12/20  5987 06/11/20 2050 20  1437  20  0120 06/10/20  1736   WBC 6.2  --   --   --  9.8 10.1   HGB 9.9* 10.0* 10.4*   < > 10.9* 12.1*   HCT 29.0* 28.9* 29.8*   < > 32.4* 35.9*     --   --   --  223 247    < > = values in this interval not displayed. Recent Labs     20  0520 06/10/20  1736    140   K 3.6 3.6    109   CO2 26 25   * 147*   BUN 13 31*   CREA 0.98 1.01   CA 7.8* 8.4*   MG 1.9  --    ALB  --  3.4   ALT  --  27   INR  --  1.1       DISCHARGE MEDICATIONS:   @  Current Discharge Medication List      START taking these medications    Details   pantoprazole (PROTONIX) 40 mg tablet Take 1 Tab by mouth Daily (before breakfast). Qty: 30 Tab, Refills: 0         CONTINUE these medications which have NOT CHANGED    Details   metFORMIN (GLUCOPHAGE) 500 mg tablet Take 500 mg by mouth two (2) times daily (with meals). atorvastatin (LIPITOR) 10 mg tablet Take 5 mg by mouth daily. My Recommended Diet, Activity, Wound Care, and follow-up labs are listed in the patient's Discharge Insturctions which I have personally completed and reviewed. Disposition:     [x] Home with family     [] [facility] PT/RN   [] SNF/NH   [] Inpatient Rehab/[facility]  Condition at Discharge:  Stable    Follow up with:   PCP : Casandra Darling MD      Please follow-up tests/labs that are still pendin. None  2.    >30 minutes spent coordinating this discharge (review instructions/follow-up, prescriptions, preparing report for sign off)    Disclaimer: Sections of this note are dictated utilizing voice recognition software, which may have resulted in some phonetic based errors in grammar and contents. Even though attempts were made to correct all the mistakes, some may have been missed, and remained in the body of the document. If questions arise, please contact our department.     Signed:  Sandi Perez MD  2020  9:05 AM

## 2020-06-12 NOTE — PROGRESS NOTES
D/C orders received. No needs identified by . Chart reviewed. Pt will be transported home by friend.  available as needed.     Maxwell Styles RN BSN  Care Manager  [phone]

## 2020-06-12 NOTE — PROGRESS NOTES
Problem: Falls - Risk of  Goal: *Absence of Falls  Description: Document Nichole Lobe Fall Risk and appropriate interventions in the flowsheet.   Note: Fall Risk Interventions:            Medication Interventions: Teach patient to arise slowly

## 2020-06-12 NOTE — ROUTINE PROCESS
Bedside and Verbal shift change report given to Gonzalo Amaya RN (oncoming nurse) by Rory Andrade RN (offgoing nurse). Report included the following information SBAR, Kardex, Procedure Summary, Intake/Output, Recent Results and Med Rec Status. Patient resting in bed quietly. No complaints at this time. Call bell and urinal in reach.

## 2021-08-03 PROBLEM — K92.2 GI BLEEDING: Status: RESOLVED | Noted: 2020-06-11 | Resolved: 2021-08-03

## (undated) DEVICE — ENDOSCOPY PUMP TUBING/ CAP SET: Brand: ERBE

## (undated) DEVICE — SYR 50ML SLIP TIP NSAF LF STRL --

## (undated) DEVICE — SYRINGE MED 25GA 3ML L5/8IN SUBQ PLAS W/ DETACH NDL SFTY

## (undated) DEVICE — MEDI-VAC SUCTION HIGH CAPACITY: Brand: CARDINAL HEALTH

## (undated) DEVICE — SOLUTION IRRIG 1000ML H2O STRL BLT

## (undated) DEVICE — MEDI-VAC NON-CONDUCTIVE SUCTION TUBING: Brand: CARDINAL HEALTH

## (undated) DEVICE — FLEX ADVANTAGE 3000CC: Brand: FLEX ADVANTAGE

## (undated) DEVICE — FCPS RAD JAW 4LC 240CM W/NDL -- BX/20 RADIAL JAW 4

## (undated) DEVICE — BITE BLOCK ENDOSCP UNIV AD 6 TO 9.4 MM

## (undated) DEVICE — CATHETER SUCT TR FL TIP 14FR W/ O CTRL

## (undated) DEVICE — BASIN EMESIS 500CC ROSE 250/CS 60/PLT: Brand: MEDEGEN MEDICAL PRODUCTS, LLC

## (undated) DEVICE — STERILE POLYISOPRENE POWDER-FREE SURGICAL GLOVES: Brand: PROTEXIS

## (undated) DEVICE — FLUFF AND POLYMER UNDERPAD,EXTRA HEAVY: Brand: WINGS

## (undated) DEVICE — AIRLIFE™ NASAL OXYGEN CANNULA CURVED, FLARED TIP WITH 14 FOOT (4.3 M) CRUSH-RESISTANT TUBING, OVER-THE-EAR STYLE: Brand: AIRLIFE™

## (undated) DEVICE — GAUZE,SPONGE,4"X4",16PLY,STRL,LF,10/TRAY: Brand: MEDLINE